# Patient Record
Sex: FEMALE | Race: WHITE
[De-identification: names, ages, dates, MRNs, and addresses within clinical notes are randomized per-mention and may not be internally consistent; named-entity substitution may affect disease eponyms.]

---

## 2019-08-07 ENCOUNTER — HOSPITAL ENCOUNTER (OUTPATIENT)
Dept: HOSPITAL 74 - JASU-SURG | Age: 74
Discharge: HOME | End: 2019-08-07
Payer: COMMERCIAL

## 2019-11-06 ENCOUNTER — HOSPITAL ENCOUNTER (OUTPATIENT)
Dept: HOSPITAL 74 - JASU-SURG | Age: 74
Discharge: HOME | End: 2019-11-06
Attending: OPHTHALMOLOGY
Payer: COMMERCIAL

## 2019-11-06 VITALS — TEMPERATURE: 97.5 F

## 2019-11-06 VITALS — DIASTOLIC BLOOD PRESSURE: 73 MMHG | HEART RATE: 75 BPM | SYSTOLIC BLOOD PRESSURE: 135 MMHG

## 2019-11-06 VITALS — BODY MASS INDEX: 24.6 KG/M2

## 2019-11-06 DIAGNOSIS — Z53.8: ICD-10-CM

## 2019-11-06 DIAGNOSIS — I10: ICD-10-CM

## 2019-11-06 DIAGNOSIS — H18.51: ICD-10-CM

## 2019-11-06 DIAGNOSIS — H40.20X0: ICD-10-CM

## 2019-11-06 DIAGNOSIS — H26.9: Primary | ICD-10-CM

## 2019-11-06 PROCEDURE — 08RJ3JZ REPLACEMENT OF RIGHT LENS WITH SYNTHETIC SUBSTITUTE, PERCUTANEOUS APPROACH: ICD-10-PCS | Performed by: OPHTHALMOLOGY

## 2019-11-06 RX ADMIN — TROPICAMIDE SCH DROP: 10 SOLUTION/ DROPS OPHTHALMIC at 08:05

## 2019-11-06 RX ADMIN — KETOROLAC TROMETHAMINE SCH DROP: 5 SOLUTION OPHTHALMIC at 08:00

## 2019-11-06 RX ADMIN — OFLOXACIN SCH DROP: 3 SOLUTION/ DROPS OPHTHALMIC at 08:00

## 2019-11-06 RX ADMIN — CYCLOPENTOLATE HYDROCHLORIDE SCH DROP: 10 SOLUTION/ DROPS OPHTHALMIC at 08:05

## 2019-11-06 RX ADMIN — PHENYLEPHRINE HYDROCHLORIDE SCH DROP: 0.25 SPRAY NASAL at 08:05

## 2019-11-06 RX ADMIN — OFLOXACIN SCH DROP: 3 SOLUTION/ DROPS OPHTHALMIC at 08:05

## 2019-11-06 RX ADMIN — CYCLOPENTOLATE HYDROCHLORIDE SCH DROP: 10 SOLUTION/ DROPS OPHTHALMIC at 08:00

## 2019-11-06 RX ADMIN — CYCLOPENTOLATE HYDROCHLORIDE SCH DROP: 10 SOLUTION/ DROPS OPHTHALMIC at 08:20

## 2019-11-06 RX ADMIN — KETOROLAC TROMETHAMINE SCH DROP: 5 SOLUTION OPHTHALMIC at 08:05

## 2019-11-06 RX ADMIN — OFLOXACIN SCH DROP: 3 SOLUTION/ DROPS OPHTHALMIC at 08:20

## 2019-11-06 RX ADMIN — KETOROLAC TROMETHAMINE SCH DROP: 5 SOLUTION OPHTHALMIC at 08:20

## 2019-11-06 RX ADMIN — TROPICAMIDE SCH DROP: 10 SOLUTION/ DROPS OPHTHALMIC at 08:20

## 2019-11-06 RX ADMIN — TROPICAMIDE SCH DROP: 10 SOLUTION/ DROPS OPHTHALMIC at 08:00

## 2019-11-06 RX ADMIN — PHENYLEPHRINE HYDROCHLORIDE SCH DROP: 0.25 SPRAY NASAL at 08:00

## 2019-11-06 RX ADMIN — PHENYLEPHRINE HYDROCHLORIDE SCH DROP: 0.25 SPRAY NASAL at 08:20

## 2019-11-07 NOTE — OP
DATE OF OPERATION:  

 

DATE OF DICTATION:  11/06/2019 

 

PREOPERATIVE DIAGNOSES:  

1.  Cataract, right eye.

2.  Narrow angle glaucoma, status post laser peripheral iridotomy.

3.  Fuchs endothelial dystrophy. 

 

POSTOPERATIVE DIAGNOSES:  

1.  Cataract, right eye.

2.  Narrow angle glaucoma, status post laser peripheral iridotomy.

3.  Fuchs endothelial dystrophy. 

 

OPERATION:  Aborted cataract extraction, right eye.  

 

SURGEON:  Jitendra Donald M.D.

 

PROCEDURE:  The patient was brought to the operating room and correctly 
identified

along with the operative site, as well as correct intraocular lens power.  She 
was

then prepped and draped in the usual sterile fashion, including 5% Betadine 
solution

in the conjunctival sac and an eyelid drape.  An eyelid speculum was then 
placed into

the right eye.  A paracentesis port was created, and lidocaine 0.5 mL was given

intracamerally.  Viscoat was then placed into the anterior chamber, but there 
was

immediate egress of Viscoat noted through  paracentesis.  With palpation

of the cornea with the Viscoat cannula, the pressure was noted to be very high.
  The

patient denied pain but had noted that the vision wass more blurred.  

 

Some of the viscoelastic was manually irrigated and aspirated from the eye 
through

the superior paracentesis with a cannula, and this seemed to relieve the 
pressure.  Mannitol 12.5

was given intravenously, and the eye was observed for approximately 15 minutes. 

Provisc was then placed into the eye to attempt to deepen the anterior chamber;

however, again, egress of viscoelastic was noted through the superior 
paracentesis

and the pressure was noted to increase.  The viscoelastic was then irrigated and

aspirated from the eye through the superior paracentesis, as thoroughly as 
possible. 

The eye was then noted to be softer. the red reflex was note to be intact 
during the entire surgery.

 

The decision was made at this point to cancel the surgery, given the narrow 
angle

attack.  Miochol was given intracamerally to shrink the pupil.  The eye was 
once again

palpated and noted to be softer. The superior paracentesis was noted to be 
water tight, there was no sign of iris prolapse to the parcentesis. Topical 
vancomycin given, the eye patched and

shielded, and the patient discharged from the operating room in stable condition
,

without any pain. 

 

 

JITENDRA DONALD M.D.

 

ROHINI/[id number]

DD: 11/06/2019 09:35

DT: 11/07/2019 06:45

Job #:  [id number]

MTDD